# Patient Record
Sex: MALE | Race: WHITE | Employment: OTHER | URBAN - METROPOLITAN AREA
[De-identification: names, ages, dates, MRNs, and addresses within clinical notes are randomized per-mention and may not be internally consistent; named-entity substitution may affect disease eponyms.]

---

## 2018-08-30 ENCOUNTER — HOSPITAL ENCOUNTER (INPATIENT)
Age: 83
LOS: 1 days | Discharge: HOME | DRG: 244 | End: 2018-08-31
Attending: EMERGENCY MEDICINE | Admitting: INTERNAL MEDICINE
Payer: COMMERCIAL

## 2018-08-30 ENCOUNTER — APPOINTMENT (OUTPATIENT)
Dept: CARDIAC CATH/INVASIVE PROCEDURES | Age: 83
DRG: 244 | End: 2018-08-30
Payer: COMMERCIAL

## 2018-08-30 ENCOUNTER — APPOINTMENT (OUTPATIENT)
Dept: GENERAL RADIOLOGY | Age: 83
DRG: 244 | End: 2018-08-30
Payer: COMMERCIAL

## 2018-08-30 DIAGNOSIS — I44.2 COMPLETE HEART BLOCK (HCC): Primary | ICD-10-CM

## 2018-08-30 LAB
ABSOLUTE EOS #: 0.17 K/UL (ref 0–0.44)
ABSOLUTE IMMATURE GRANULOCYTE: <0.03 K/UL (ref 0–0.3)
ABSOLUTE LYMPH #: 1.59 K/UL (ref 1.1–3.7)
ABSOLUTE MONO #: 0.78 K/UL (ref 0.1–1.2)
ANION GAP SERPL CALCULATED.3IONS-SCNC: 9 MMOL/L (ref 9–17)
BASOPHILS # BLD: 1 % (ref 0–2)
BASOPHILS ABSOLUTE: 0.05 K/UL (ref 0–0.2)
BUN BLDV-MCNC: 14 MG/DL (ref 8–23)
BUN/CREAT BLD: NORMAL (ref 9–20)
CALCIUM SERPL-MCNC: 8.6 MG/DL (ref 8.6–10.4)
CHLORIDE BLD-SCNC: 105 MMOL/L (ref 98–107)
CO2: 24 MMOL/L (ref 20–31)
CREAT SERPL-MCNC: 0.73 MG/DL (ref 0.7–1.2)
DIFFERENTIAL TYPE: ABNORMAL
EKG ATRIAL RATE: 54 BPM
EKG P AXIS: 47 DEGREES
EKG Q-T INTERVAL: 544 MS
EKG QRS DURATION: 130 MS
EKG QTC CALCULATION (BAZETT): 390 MS
EKG R AXIS: -17 DEGREES
EKG T AXIS: -2 DEGREES
EKG VENTRICULAR RATE: 31 BPM
EOSINOPHILS RELATIVE PERCENT: 2 % (ref 1–4)
GFR AFRICAN AMERICAN: >60 ML/MIN
GFR NON-AFRICAN AMERICAN: >60 ML/MIN
GFR SERPL CREATININE-BSD FRML MDRD: NORMAL ML/MIN/{1.73_M2}
GFR SERPL CREATININE-BSD FRML MDRD: NORMAL ML/MIN/{1.73_M2}
GLUCOSE BLD-MCNC: 93 MG/DL (ref 70–99)
HCT VFR BLD CALC: 43.9 % (ref 40.7–50.3)
HEMOGLOBIN: 14.3 G/DL (ref 13–17)
IMMATURE GRANULOCYTES: 0 %
LYMPHOCYTES # BLD: 19 % (ref 24–43)
MCH RBC QN AUTO: 30.9 PG (ref 25.2–33.5)
MCHC RBC AUTO-ENTMCNC: 32.6 G/DL (ref 28.4–34.8)
MCV RBC AUTO: 94.8 FL (ref 82.6–102.9)
MONOCYTES # BLD: 10 % (ref 3–12)
NRBC AUTOMATED: 0 PER 100 WBC
PDW BLD-RTO: 13.3 % (ref 11.8–14.4)
PLATELET # BLD: 186 K/UL (ref 138–453)
PLATELET ESTIMATE: ABNORMAL
PMV BLD AUTO: 11 FL (ref 8.1–13.5)
POC TROPONIN I: 0.05 NG/ML (ref 0–0.1)
POC TROPONIN INTERP: NORMAL
POTASSIUM SERPL-SCNC: 4.3 MMOL/L (ref 3.7–5.3)
RBC # BLD: 4.63 M/UL (ref 4.21–5.77)
RBC # BLD: ABNORMAL 10*6/UL
SEG NEUTROPHILS: 68 % (ref 36–65)
SEGMENTED NEUTROPHILS ABSOLUTE COUNT: 5.59 K/UL (ref 1.5–8.1)
SODIUM BLD-SCNC: 138 MMOL/L (ref 135–144)
TROPONIN INTERP: ABNORMAL
TROPONIN INTERP: NORMAL
TROPONIN INTERP: NORMAL
TROPONIN T: 0.08 NG/ML
TROPONIN T: <0.03 NG/ML
TROPONIN T: <0.03 NG/ML
TSH SERPL DL<=0.05 MIU/L-ACNC: 1.65 MIU/L (ref 0.3–5)
WBC # BLD: 8.2 K/UL (ref 3.5–11.3)
WBC # BLD: ABNORMAL 10*3/UL

## 2018-08-30 PROCEDURE — 99285 EMERGENCY DEPT VISIT HI MDM: CPT

## 2018-08-30 PROCEDURE — 71045 X-RAY EXAM CHEST 1 VIEW: CPT

## 2018-08-30 PROCEDURE — 6370000000 HC RX 637 (ALT 250 FOR IP): Performed by: HOSPITALIST

## 2018-08-30 PROCEDURE — 2500000003 HC RX 250 WO HCPCS

## 2018-08-30 PROCEDURE — 2709999900 HC NON-CHARGEABLE SUPPLY

## 2018-08-30 PROCEDURE — 93005 ELECTROCARDIOGRAM TRACING: CPT

## 2018-08-30 PROCEDURE — 33208 INSRT HEART PM ATRIAL & VENT: CPT

## 2018-08-30 PROCEDURE — 0JH606Z INSERTION OF PACEMAKER, DUAL CHAMBER INTO CHEST SUBCUTANEOUS TISSUE AND FASCIA, OPEN APPROACH: ICD-10-PCS | Performed by: INTERNAL MEDICINE

## 2018-08-30 PROCEDURE — 85025 COMPLETE CBC W/AUTO DIFF WBC: CPT

## 2018-08-30 PROCEDURE — C1898 LEAD, PMKR, OTHER THAN TRANS: HCPCS

## 2018-08-30 PROCEDURE — 84443 ASSAY THYROID STIM HORMONE: CPT

## 2018-08-30 PROCEDURE — 2580000003 HC RX 258: Performed by: HOSPITALIST

## 2018-08-30 PROCEDURE — 2060000000 HC ICU INTERMEDIATE R&B

## 2018-08-30 PROCEDURE — 02H63JZ INSERTION OF PACEMAKER LEAD INTO RIGHT ATRIUM, PERCUTANEOUS APPROACH: ICD-10-PCS | Performed by: INTERNAL MEDICINE

## 2018-08-30 PROCEDURE — 02HK3JZ INSERTION OF PACEMAKER LEAD INTO RIGHT VENTRICLE, PERCUTANEOUS APPROACH: ICD-10-PCS | Performed by: INTERNAL MEDICINE

## 2018-08-30 PROCEDURE — 93308 TTE F-UP OR LMTD: CPT

## 2018-08-30 PROCEDURE — C1894 INTRO/SHEATH, NON-LASER: HCPCS

## 2018-08-30 PROCEDURE — 2580000003 HC RX 258

## 2018-08-30 PROCEDURE — C1785 PMKR, DUAL, RATE-RESP: HCPCS

## 2018-08-30 PROCEDURE — 84484 ASSAY OF TROPONIN QUANT: CPT

## 2018-08-30 PROCEDURE — 6370000000 HC RX 637 (ALT 250 FOR IP): Performed by: INTERNAL MEDICINE

## 2018-08-30 PROCEDURE — 6360000002 HC RX W HCPCS

## 2018-08-30 PROCEDURE — 80048 BASIC METABOLIC PNL TOTAL CA: CPT

## 2018-08-30 PROCEDURE — 36415 COLL VENOUS BLD VENIPUNCTURE: CPT

## 2018-08-30 RX ORDER — LISINOPRIL 20 MG/1
20 TABLET ORAL DAILY
Status: DISCONTINUED | OUTPATIENT
Start: 2018-08-30 | End: 2018-08-31 | Stop reason: HOSPADM

## 2018-08-30 RX ORDER — SODIUM CHLORIDE 0.9 % (FLUSH) 0.9 %
10 SYRINGE (ML) INJECTION EVERY 12 HOURS SCHEDULED
Status: CANCELLED | OUTPATIENT
Start: 2018-08-30

## 2018-08-30 RX ORDER — POTASSIUM CHLORIDE 20 MEQ/1
40 TABLET, EXTENDED RELEASE ORAL PRN
Status: DISCONTINUED | OUTPATIENT
Start: 2018-08-30 | End: 2018-08-31 | Stop reason: HOSPADM

## 2018-08-30 RX ORDER — SIMVASTATIN 20 MG
TABLET ORAL
COMMUNITY
Start: 2018-03-08

## 2018-08-30 RX ORDER — OXYCODONE HYDROCHLORIDE 5 MG/1
5 TABLET ORAL EVERY 4 HOURS PRN
Status: DISCONTINUED | OUTPATIENT
Start: 2018-08-30 | End: 2018-08-31 | Stop reason: HOSPADM

## 2018-08-30 RX ORDER — SODIUM CHLORIDE 0.9 % (FLUSH) 0.9 %
10 SYRINGE (ML) INJECTION PRN
Status: DISCONTINUED | OUTPATIENT
Start: 2018-08-30 | End: 2018-08-30 | Stop reason: SDUPTHER

## 2018-08-30 RX ORDER — MELOXICAM 15 MG/1
1 TABLET ORAL
Status: ON HOLD | COMMUNITY
Start: 2016-02-02 | End: 2018-08-30 | Stop reason: ALTCHOICE

## 2018-08-30 RX ORDER — SODIUM CHLORIDE 0.9 % (FLUSH) 0.9 %
10 SYRINGE (ML) INJECTION PRN
Status: CANCELLED | OUTPATIENT
Start: 2018-08-30

## 2018-08-30 RX ORDER — ONDANSETRON 2 MG/ML
4 INJECTION INTRAMUSCULAR; INTRAVENOUS EVERY 6 HOURS PRN
Status: DISCONTINUED | OUTPATIENT
Start: 2018-08-30 | End: 2018-08-30 | Stop reason: SDUPTHER

## 2018-08-30 RX ORDER — OXYCODONE HYDROCHLORIDE 5 MG/1
10 TABLET ORAL EVERY 4 HOURS PRN
Status: DISCONTINUED | OUTPATIENT
Start: 2018-08-30 | End: 2018-08-31 | Stop reason: HOSPADM

## 2018-08-30 RX ORDER — HYDRALAZINE HYDROCHLORIDE 20 MG/ML
10 INJECTION INTRAMUSCULAR; INTRAVENOUS EVERY 6 HOURS PRN
Status: DISCONTINUED | OUTPATIENT
Start: 2018-08-30 | End: 2018-08-30 | Stop reason: RX

## 2018-08-30 RX ORDER — ONDANSETRON 2 MG/ML
4 INJECTION INTRAMUSCULAR; INTRAVENOUS EVERY 6 HOURS PRN
Status: DISCONTINUED | OUTPATIENT
Start: 2018-08-30 | End: 2018-08-31 | Stop reason: HOSPADM

## 2018-08-30 RX ORDER — SODIUM CHLORIDE 0.9 % (FLUSH) 0.9 %
10 SYRINGE (ML) INJECTION PRN
Status: DISCONTINUED | OUTPATIENT
Start: 2018-08-30 | End: 2018-08-31 | Stop reason: HOSPADM

## 2018-08-30 RX ORDER — ATROPINE SULFATE 0.1 MG/ML
1 INJECTION INTRAVENOUS PRN
Status: DISCONTINUED | OUTPATIENT
Start: 2018-08-30 | End: 2018-08-31 | Stop reason: HOSPADM

## 2018-08-30 RX ORDER — VANCOMYCIN HYDROCHLORIDE 1 G/200ML
1000 INJECTION, SOLUTION INTRAVENOUS ONCE
Status: DISCONTINUED | OUTPATIENT
Start: 2018-08-30 | End: 2018-08-31 | Stop reason: HOSPADM

## 2018-08-30 RX ORDER — DOXYCYCLINE HYCLATE 100 MG
100 TABLET ORAL EVERY 12 HOURS SCHEDULED
Status: DISCONTINUED | OUTPATIENT
Start: 2018-08-30 | End: 2018-08-31 | Stop reason: HOSPADM

## 2018-08-30 RX ORDER — ACETAMINOPHEN 325 MG/1
650 TABLET ORAL EVERY 4 HOURS PRN
Status: DISCONTINUED | OUTPATIENT
Start: 2018-08-30 | End: 2018-08-31 | Stop reason: HOSPADM

## 2018-08-30 RX ORDER — LISINOPRIL 20 MG/1
1 TABLET ORAL 2 TIMES DAILY
COMMUNITY
Start: 2017-11-06

## 2018-08-30 RX ORDER — MAGNESIUM SULFATE 1 G/100ML
1 INJECTION INTRAVENOUS PRN
Status: DISCONTINUED | OUTPATIENT
Start: 2018-08-30 | End: 2018-08-31 | Stop reason: HOSPADM

## 2018-08-30 RX ORDER — POTASSIUM CHLORIDE 7.45 MG/ML
10 INJECTION INTRAVENOUS PRN
Status: DISCONTINUED | OUTPATIENT
Start: 2018-08-30 | End: 2018-08-31 | Stop reason: HOSPADM

## 2018-08-30 RX ORDER — SODIUM CHLORIDE 0.9 % (FLUSH) 0.9 %
10 SYRINGE (ML) INJECTION EVERY 12 HOURS SCHEDULED
Status: DISCONTINUED | OUTPATIENT
Start: 2018-08-30 | End: 2018-08-30 | Stop reason: SDUPTHER

## 2018-08-30 RX ORDER — SODIUM CHLORIDE 0.9 % (FLUSH) 0.9 %
10 SYRINGE (ML) INJECTION EVERY 12 HOURS SCHEDULED
Status: DISCONTINUED | OUTPATIENT
Start: 2018-08-30 | End: 2018-08-31 | Stop reason: HOSPADM

## 2018-08-30 RX ORDER — SIMVASTATIN 20 MG
20 TABLET ORAL NIGHTLY
Status: DISCONTINUED | OUTPATIENT
Start: 2018-08-30 | End: 2018-08-31 | Stop reason: HOSPADM

## 2018-08-30 RX ORDER — MULTIVIT WITH MINERALS/LUTEIN
1000 TABLET ORAL DAILY
COMMUNITY

## 2018-08-30 RX ORDER — HYDRALAZINE HYDROCHLORIDE 25 MG/1
1 TABLET, FILM COATED ORAL
Status: ON HOLD | COMMUNITY
Start: 2015-01-21 | End: 2018-08-30 | Stop reason: ALTCHOICE

## 2018-08-30 RX ORDER — LABETALOL HYDROCHLORIDE 5 MG/ML
5 INJECTION, SOLUTION INTRAVENOUS EVERY 6 HOURS PRN
Status: DISCONTINUED | OUTPATIENT
Start: 2018-08-30 | End: 2018-08-31 | Stop reason: HOSPADM

## 2018-08-30 RX ORDER — AMLODIPINE BESYLATE 10 MG/1
10 TABLET ORAL DAILY
Status: DISCONTINUED | OUTPATIENT
Start: 2018-08-30 | End: 2018-08-31 | Stop reason: HOSPADM

## 2018-08-30 RX ORDER — AMLODIPINE BESYLATE 5 MG/1
1 TABLET ORAL
COMMUNITY
Start: 2016-10-19

## 2018-08-30 RX ORDER — POTASSIUM CHLORIDE 20MEQ/15ML
40 LIQUID (ML) ORAL PRN
Status: DISCONTINUED | OUTPATIENT
Start: 2018-08-30 | End: 2018-08-31 | Stop reason: HOSPADM

## 2018-08-30 RX ORDER — SODIUM CHLORIDE 9 MG/ML
INJECTION, SOLUTION INTRAVENOUS CONTINUOUS
Status: DISCONTINUED | OUTPATIENT
Start: 2018-08-30 | End: 2018-08-30

## 2018-08-30 RX ADMIN — ACETAMINOPHEN 650 MG: 325 TABLET ORAL at 22:44

## 2018-08-30 RX ADMIN — Medication 10 ML: at 21:01

## 2018-08-30 RX ADMIN — DOXYCYCLINE HYCLATE 100 MG: 100 TABLET, COATED ORAL at 21:00

## 2018-08-30 RX ADMIN — LISINOPRIL 20 MG: 20 TABLET ORAL at 17:51

## 2018-08-30 RX ADMIN — SIMVASTATIN 20 MG: 20 TABLET, FILM COATED ORAL at 21:00

## 2018-08-30 ASSESSMENT — PAIN SCALES - GENERAL: PAINLEVEL_OUTOF10: 9

## 2018-08-30 NOTE — ED PROVIDER NOTES
[facility] 79. 3  Emergency Department Encounter  Emergency Medicine Resident     Pt Name: Dheeraj Holguin  MRN: [id number]  [place] 1935  Date of evaluation: 8/30/18  PCP:  Tish Pérez MD    [address] [place] [address]       Chief Complaint   Patient presents with    Shortness of Breath     upon exertion          HISTORY OF PRESENT ILLNESS  (Location/Symptom, Timing/Onset, Context/Setting, Quality, Duration, Modifying Factors, Severity.)      Dheeraj Holguin is a 82 y.o. male who presents with SOB. He was at a cardiology appt with Dr. Tish Pérez this morning where he was found to be bradycardic to the 30s. EKG shows complete heart block. Patient has noticed SOB on exertion, increasing fatigue, palpitations and just \"not feeling well\" since August 12th. He denies SOB at rest, chest pain, dizziness, syncope, diaphoresis, heart racing, HA, abd pain, fever, chills. He has no known heart problems. PAST MEDICAL / SURGICAL / SOCIAL / FAMILY HISTORY      has a past medical history of Hyperlipidemia and Hypertension. Denies relevant past surgical history  Social History     Social History    Marital status:      Spouse name: N/A    Number of children: N/A    Years of education: N/A     Occupational History    Not on file. Social History Main Topics    Smoking status: Never Smoker    Smokeless tobacco: Never Used    Alcohol use No    Drug use: No    Sexual activity: Not on file     Other Topics Concern    Not on file     Social History Narrative    No narrative on file       Patient advised to stop smoking or to avoid tobacco use. History reviewed. No pertinent family history. Allergies:  Cephalosporins    Home Medications:  Prior to Admission medications    Medication Sig Start Date End Date Taking?  Authorizing Provider   amLODIPine (NORVASC) 5 MG tablet Take 1 tablet by mouth 10/19/16  Yes Historical Provider, MD   lisinopril (PRINIVIL;ZESTRIL) 20 MG tablet Take 1 tablet by mouth 2 times daily 11/6/17  Yes Historical Provider, MD   simvastatin (ZOCOR) 20 MG tablet TAKE 1 TABLET DAILY 3/8/18  Yes Historical Provider, MD   Ascorbic Acid (VITAMIN C) 1000 MG tablet Take 1,000 mg by mouth daily    Historical Provider, MD       REVIEW OF SYSTEMS    (2-9 systems for level 4, 10 or more for level 5)      Review of Systems   Constitutional: Positive for fatigue. Negative for chills and fever. Respiratory: Positive for shortness of breath. Negative for cough and chest tightness. Cardiovascular: Negative for chest pain and leg swelling. Gastrointestinal: Negative for abdominal pain, diarrhea, nausea and vomiting. Genitourinary: Negative for dysuria and hematuria. Musculoskeletal: Negative for arthralgias and back pain. Skin: Negative for rash and wound. Allergic/Immunologic: Negative for food allergies and immunocompromised state. Neurological: Negative for dizziness, syncope, weakness and headaches. Psychiatric/Behavioral: Negative for suicidal ideas. The patient is not nervous/anxious. PHYSICAL EXAM   (up to 7 for level 4, 8 or more for level 5)      INITIAL VITALS:   /66   Pulse (!) 31   Temp 97.8 °F (36.6 °C) (Oral)   Resp 18   Ht 6' (1.829 m)   Wt 180 lb (81.6 kg)   SpO2 95%   BMI 24.41 kg/m²     Physical Exam   Constitutional: He is oriented to person, place, and time. He appears well-developed and well-nourished. No distress. HENT:   Head: Normocephalic and atraumatic. Right Ear: External ear normal.   Left Ear: External ear normal.   Nose: Nose normal.   Eyes: Pupils are equal, round, and reactive to light. Conjunctivae and EOM are normal. Right eye exhibits no discharge. Left eye exhibits no discharge. Neck: Normal range of motion. Neck supple. No JVD present. No tracheal deviation present. Cardiovascular: Regular rhythm and normal heart sounds. Bradycardia present. Exam reveals no gallop and no friction rub. No murmur heard.   Pulmonary/Chest: Effort 107 mmol/L    CO2 24 20 - 31 mmol/L    Anion Gap 9 9 - 17 mmol/L    GFR Non-African American >60 >60 mL/min    GFR African American >60 >60 mL/min    GFR Comment          GFR Staging NOT REPORTED    CBC Auto Differential   Result Value Ref Range    WBC 8.2 3.5 - 11.3 k/uL    RBC 4.63 4.21 - 5.77 m/uL    Hemoglobin 14.3 13.0 - 17.0 g/dL    Hematocrit 43.9 40.7 - 50.3 %    MCV 94.8 82.6 - 102.9 fL    MCH 30.9 25.2 - 33.5 pg    MCHC 32.6 28.4 - 34.8 g/dL    RDW 13.3 11.8 - 14.4 %    Platelets 346 469 - 754 k/uL    MPV 11.0 8.1 - 13.5 fL    NRBC Automated 0.0 0.0 per 100 WBC    Differential Type NOT REPORTED     Seg Neutrophils 68 (H) 36 - 65 %    Lymphocytes 19 (L) 24 - 43 %    Monocytes 10 3 - 12 %    Eosinophils % 2 1 - 4 %    Basophils 1 0 - 2 %    Immature Granulocytes 0 0 %    Segs Absolute 5.59 1.50 - 8.10 k/uL    Absolute Lymph # 1.59 1.10 - 3.70 k/uL    Absolute Mono # 0.78 0.10 - 1.20 k/uL    Absolute Eos # 0.17 0.00 - 0.44 k/uL    Basophils # 0.05 0.00 - 0.20 k/uL    Absolute Immature Granulocyte <0.03 0.00 - 0.30 k/uL    WBC Morphology NOT REPORTED     RBC Morphology NOT REPORTED     Platelet Estimate NOT REPORTED    Troponin   Result Value Ref Range    Troponin T <0.03 <0.03 ng/mL    Troponin Interp         TSH with Reflex   Result Value Ref Range    TSH 1.65 0.30 - 5.00 mIU/L   POCT troponin   Result Value Ref Range    POC Troponin I 0.05 0.00 - 0.10 ng/mL    POC Troponin Interp       The Troponin-I (POC) results cannot be compared to the Troponin-T results. EKG 12 Lead   Result Value Ref Range    Ventricular Rate 31 BPM    Atrial Rate 54 BPM    QRS Duration 130 ms    Q-T Interval 544 ms    QTc Calculation (Bazett) 390 ms    P Axis 47 degrees    R Axis -17 degrees    T Axis -2 degrees       IMPRESSION: This is an 82-year-old male presenting for shortness of breath from his cardiologist office. On physical exam the patient is well-appearing and in no acute distress.   His pulse is 31, respirations are 12 questions. PROCEDURES:  None    CONSULTS:  IP CONSULT TO CARDIOLOGY  IP CONSULT TO INTERNAL MEDICINE      FINAL IMPRESSION      1.  Complete heart block ([address]          DISPOSITION / PLAN     DISPOSITION Admitted 08/30/2018 11:59:25 AM      PATIENT REFERRED TO:  Cristo Howard MD  [phone]  [facility] [phone]            DISCHARGE MEDICATIONS:  Current Discharge Medication List          Nancy Patel DO  Emergency Medicine Resident    (Please note that portions of this note were completed with a voice recognition program.  Efforts were made to edit the dictations but occasionally words are mis-transcribed.)     Nancy Patel,   08/30/18 [address], DO  09/10/18 4230

## 2018-08-30 NOTE — ED PROVIDER NOTES
[facility]     Emergency Department     Faculty Attestation    I performed a history and physical examination of the patient and discussed management with the resident. I have reviewed and agree with the residents findings including all diagnostic interpretations, and treatment plans as written at the time of my review. Any areas of disagreement are noted on the chart. I was personally present for the key portions of any procedures. I have documented in the chart those procedures where I was not present during the key portions. I agree with the chief complaint, past medical history, past surgical history, allergies, medications, social and family history as documented unless otherwise noted below. Documentation of the HPI, Physical Exam and Medical Decision Making performed by miladysibyessica is based on my personal performance of the HPI, PE and MDM. For Physician Assistant/ Nurse Practitioner cases/documentation I have personally evaluated this patient and have completed at least one if not all key elements of the E/M (history, physical exam, and MDM). Additional findings are as noted. Primary Care Physician: Christi Kidd MD    History: This is a 82 y.o. male who presents to the Emergency Department with complaint of Shortness of breath. The patient presents with a complaint of shortness of breath and fatigue that began on August 12. Patient denies any cough or chest pain. The patient denies any fever, chills or sweats. The patient denied noting any increased swelling of his extremities more than his baseline. The patient was seen at his cardiologist's office this morning and sent to the emergency department immediately for workup and admission or bradycardia. Physical:   height is 6' (1.829 m) and weight is 180 lb (81.6 kg). His oral temperature is 97.8 °F (36.6 °C). His pulse is 31 (abnormal). His respiration is 12 and oxygen saturation is 97%.   Lungs are clear auscultation bilaterally, heart bradycardic with a regular rhythm, abdomen is soft nontender, lower extremity 2+ pitting edema bilaterally    Impression: Third-degree heart block    Plan: [place], CBC, BMP, troponin, cardiology consultation, admission      EKG Interpretation    Interpreted by me  Bradycardia with a complete heart block in any ventricular rhythm ventricular rate of 31, right bundle branch block, minimal voltage criteria for left ventricular hypertrophy, normal QT corrected, Left axis deviation, Inferior infarct, age indeterminant  Impression: Bradycardia with a ventricular rate of 30, complete heart block, right bundle branch block, minimal for discharge of left ventricular hypertrophy, Left axis deviation, inferior infarct, age undetermined  Compared EKG of May 25, 2010, third-degree heart block has replaced previous sinus bradycardia, ventricular rate has decreased by 14        CRITICAL CARE: There was a high probability of clinically significant/life threatening deterioration in this patient's condition which required my urgent intervention. Total critical care time was 10 minutes. This excludes any time for separately reportable procedures. (Please note that portions of this note were completed with a voice recognition program.  Efforts were made to edit the dictations but occasionally words are mis-transcribed.)    Lea Burnette.  Davin Sotelo MD, [facility]  Attending Emergency Medicine Physician        Zahida Bryant MD  08/30/18 [address] [facility] Davin Sotelo MD  08/30/18 [id number]

## 2018-08-30 NOTE — CARE COORDINATION
Case Management Initial Discharge Plan  Lavtiara Older,         Readmission Risk              Risk of Unplanned Readmission:        0               Met with:patient to discuss discharge plans. Information verified: address, contacts, phone number, , insurance Yes  PCP: Rufina Saxena MD  Date of last visit: last year    Insurance Provider: [employer]    Discharge Planning    Living Arrangements:      Support Systems:       Home has 1 stories  2 stairs to climb to get into front door. Patient able to perform ADL's:Independent    Current Services (outpatient & in home) none  DME equipment: has a cane, walker, wheelchair, elevated toilet sea. .does note use. DME provider:     Ph[facility]acy: [facility] in [place] and [address] Potential Assistance Purchasing Medications:     Does patient want to participate in local refill/ meds to beds program?       Potential Assistance Needed:       Patient agreeable to home care: No  [place] of choice provided:  n/a    Prior SNF/Rehab Placement and Facility:   Agreeable to SNF/Rehab: No  [place] of choice provided: n/a   Evaluation: no    Expected Discharge date:     Patient expects to be discharged to: Follow Up Appointment: Best Day/ Time:      Transportation provider: self  Transportation arrangements needed for discharge: No    Discharge Plan: return to home, no skilled needs identified at present.         Electronically signed by Juana Tavera RN on 18 at 12:50 PM

## 2018-08-30 NOTE — PROCEDURES
technique with 2 separate sticks. J tip 0.035 inch guide wires were introduced and their course through the venous system was confirmed by their presence under fluoroscopy in the inferior vena. RV Lead Implant:   A peel away sheath was brought to the field and placed into the venous system via over the wire technique. The right ventricular lead was placed via this sheath into the right ventricle under fluoroscopy. The lead was attached via active fixation to the distal RV septum. Adequate sensing and threshold parameters were obtained. There was no evidence of diaphragmatic stimulation at 10 V output. The peel away sheath was removed and the lead collar was advanced to the pectoral muscle and sutured with non absorbable suture. Stability of the lead and the length of the lead's slack was assessed as optimal with fluoroscopy. RA Lead Implant:  A peel away sheath was brought to the field and placed into the venous system via over the wire technique. The right atrial lead was placed via this sheath into the right atrium. The lead was attached via active fixation to the high lateral right atrial free wall. Adequate sensing and threshold parameters were obtained. There was no evidence of diaphragmatic stimulation at 10 V output. The peel away sheath was removed and the lead collar was advanced to the pectoral muscle and sutured with non absorbable suture. Stability of the lead and the length of the lead's slack was assessed as optimal with fluoroscopy. Generator: The implanted leads were attached to the dual chamber PPM device using the setscrews. The pocket was irrigated with antibiotic solution. The pulse generator and leads were coiled and placed in the pocket. Fluoroscopy was used to verify the final placement of the pacemaker and leads. The pocket was closed using multiple layers of suture and a dry sterile dressing was applied.      There were no complications, patient tolerated the procedure

## 2018-08-30 NOTE — CONSULTS
Attestation signed by      Attending Physician Statement:    I have discussed the care of  Gisella Huerta , including pertinent history and exam findings, with the Cardiology fellow/resident. I have seen and examined the patient and the key elements of all parts of the encounter have been performed by me. I agree with the assessment, plan and orders as documented by the fellow/resident, after I modified exam findings and plan of treatments, and the final version is my approved version of the assessment. Additional Comments: he has CHB with sx, plan temporary pacer today and PPM tomorrow. Michael Ly MD               [facility]    Consult / H&P               Today's Date: 8/30/2018  Patient Name: Gisella Huerta  Date of admission: 8/30/2018 10:32 AM  Patient's age: 82 y.o., 1935  Admission Dx: Complete heart block ([address] [I44.2]    Reason for Consult:  Complete Heart Block    Requesting Physician: No admitting provider for patient encounter. CHIEF COMPLAINT:  Increased shortness of breath    History Obtained From:  patient, electronic medical record    HISTORY OF PRESENT ILLNESS:      The patient is a 82 y.o.  male with PMH of Hypertension who is admitted to the hospital for increased shortness of breath since 2 weeks. Patient was seen at cardiology clinic today and was sent to the ER for bradycardia. EKG shows Complete Heart Block. HR in 20-30s. Patient resting comfortably on the bed. He sees Dr. Jesse Gilford once a year and states he goes only for annual check up. States he was there in December. He has been a very active person and complains of only becoming short of breath when he exerts. Denies any chest pain, LE edema. Echo TTE 4/09/18: EF 55%. Mild MR/TR. Denies any previous stents of bypass surgery in the past. Denies being on any blood thinners and not even Aspirin. Past Medical History:   has a past medical history of Hyperlipidemia and Hypertension.     Past input(s): AST, ALT, LABALBU in the last 72 hours. IMPRESSION:    Patient Active Problem List   Diagnosis    Complete heart block (HCC)     IMPRESSION    1. Complete Heart Block  2. Hypertension  3. Hyperlipidemia      RECOMMENDATIONS:    1. Temporary Pacemaker today. Will discuss with attending for final recommendations. Discussed with patient and Nurse.     Electronically signed by Kylah Alarcon MD on 8/30/2018 at 12:30 PM    [facility]      [phone]

## 2018-08-30 NOTE — ED NOTES
Pt to ED via EMS per Dr. Jemima Mendoza office. Pt said he had a check up at Dr. Jemima Mendoza because he is scheduled to get a pacemaker, however, upon arrival and checking his vitals they noticed his HR was in the 30's and in a 3rd degree heartblock. Recommneded pt be brought here via Ems. Pt is alert and orientedx3, denies any pain, SOB upon exertion. Pt denies any use of home oxygen. Will continue to monitor.       Haroon Melendez RN  08/30/18 0593

## 2018-08-30 NOTE — PROGRESS NOTES
Cardiac Testing:    TTE 4/09/18: EF 55%. Mild MR/TR    STRESS 6/18/08: No ischemia or infarct.  EF 47%

## 2018-08-30 NOTE — ED NOTES
Bed: 33  Expected date:   Expected time:   Means of arrival:   Comments:  LANA [address], RN  08/30/18 [id number]

## 2018-08-31 ENCOUNTER — APPOINTMENT (OUTPATIENT)
Dept: GENERAL RADIOLOGY | Age: 83
DRG: 244 | End: 2018-08-31
Payer: COMMERCIAL

## 2018-08-31 VITALS
SYSTOLIC BLOOD PRESSURE: 129 MMHG | HEART RATE: 61 BPM | HEIGHT: 72 IN | BODY MASS INDEX: 27.5 KG/M2 | WEIGHT: 203 LBS | DIASTOLIC BLOOD PRESSURE: 69 MMHG | TEMPERATURE: 98.2 F | OXYGEN SATURATION: 96 % | RESPIRATION RATE: 18 BRPM

## 2018-08-31 PROBLEM — I10 HYPERTENSION: Status: ACTIVE | Noted: 2018-08-31

## 2018-08-31 PROBLEM — E78.5 HYPERLIPIDEMIA: Status: ACTIVE | Noted: 2018-08-31

## 2018-08-31 LAB
ABSOLUTE EOS #: 0.18 K/UL (ref 0–0.44)
ABSOLUTE IMMATURE GRANULOCYTE: 0.04 K/UL (ref 0–0.3)
ABSOLUTE LYMPH #: 1.72 K/UL (ref 1.1–3.7)
ABSOLUTE MONO #: 0.97 K/UL (ref 0.1–1.2)
ANION GAP SERPL CALCULATED.3IONS-SCNC: 13 MMOL/L (ref 9–17)
BASOPHILS # BLD: 1 % (ref 0–2)
BASOPHILS ABSOLUTE: 0.06 K/UL (ref 0–0.2)
BUN BLDV-MCNC: 20 MG/DL (ref 8–23)
BUN/CREAT BLD: ABNORMAL (ref 9–20)
CALCIUM SERPL-MCNC: 8.3 MG/DL (ref 8.6–10.4)
CHLORIDE BLD-SCNC: 104 MMOL/L (ref 98–107)
CO2: 22 MMOL/L (ref 20–31)
CREAT SERPL-MCNC: 0.86 MG/DL (ref 0.7–1.2)
DIFFERENTIAL TYPE: ABNORMAL
EOSINOPHILS RELATIVE PERCENT: 2 % (ref 1–4)
GFR AFRICAN AMERICAN: >60 ML/MIN
GFR NON-AFRICAN AMERICAN: >60 ML/MIN
GFR SERPL CREATININE-BSD FRML MDRD: ABNORMAL ML/MIN/{1.73_M2}
GFR SERPL CREATININE-BSD FRML MDRD: ABNORMAL ML/MIN/{1.73_M2}
GLUCOSE BLD-MCNC: 79 MG/DL (ref 70–99)
HCT VFR BLD CALC: 43.5 % (ref 40.7–50.3)
HEMOGLOBIN: 14 G/DL (ref 13–17)
IMMATURE GRANULOCYTES: 0 %
LYMPHOCYTES # BLD: 16 % (ref 24–43)
MCH RBC QN AUTO: 30.3 PG (ref 25.2–33.5)
MCHC RBC AUTO-ENTMCNC: 32.2 G/DL (ref 28.4–34.8)
MCV RBC AUTO: 94.2 FL (ref 82.6–102.9)
MONOCYTES # BLD: 9 % (ref 3–12)
NRBC AUTOMATED: 0 PER 100 WBC
PDW BLD-RTO: 13.3 % (ref 11.8–14.4)
PLATELET # BLD: 193 K/UL (ref 138–453)
PLATELET ESTIMATE: ABNORMAL
PMV BLD AUTO: 10.2 FL (ref 8.1–13.5)
POTASSIUM SERPL-SCNC: 4.2 MMOL/L (ref 3.7–5.3)
RBC # BLD: 4.62 M/UL (ref 4.21–5.77)
RBC # BLD: ABNORMAL 10*6/UL
SEG NEUTROPHILS: 72 % (ref 36–65)
SEGMENTED NEUTROPHILS ABSOLUTE COUNT: 7.95 K/UL (ref 1.5–8.1)
SODIUM BLD-SCNC: 139 MMOL/L (ref 135–144)
TROPONIN INTERP: ABNORMAL
TROPONIN T: 0.08 NG/ML
WBC # BLD: 10.9 K/UL (ref 3.5–11.3)
WBC # BLD: ABNORMAL 10*3/UL

## 2018-08-31 PROCEDURE — 80048 BASIC METABOLIC PNL TOTAL CA: CPT

## 2018-08-31 PROCEDURE — 85025 COMPLETE CBC W/AUTO DIFF WBC: CPT

## 2018-08-31 PROCEDURE — 97162 PT EVAL MOD COMPLEX 30 MIN: CPT

## 2018-08-31 PROCEDURE — 94762 N-INVAS EAR/PLS OXIMTRY CONT: CPT

## 2018-08-31 PROCEDURE — 97530 THERAPEUTIC ACTIVITIES: CPT

## 2018-08-31 PROCEDURE — 36415 COLL VENOUS BLD VENIPUNCTURE: CPT

## 2018-08-31 PROCEDURE — G8978 MOBILITY CURRENT STATUS: HCPCS

## 2018-08-31 PROCEDURE — 6370000000 HC RX 637 (ALT 250 FOR IP): Performed by: HOSPITALIST

## 2018-08-31 PROCEDURE — 6370000000 HC RX 637 (ALT 250 FOR IP): Performed by: INTERNAL MEDICINE

## 2018-08-31 PROCEDURE — 84484 ASSAY OF TROPONIN QUANT: CPT

## 2018-08-31 PROCEDURE — 71046 X-RAY EXAM CHEST 2 VIEWS: CPT

## 2018-08-31 PROCEDURE — G8979 MOBILITY GOAL STATUS: HCPCS

## 2018-08-31 PROCEDURE — 2580000003 HC RX 258: Performed by: HOSPITALIST

## 2018-08-31 PROCEDURE — 99222 1ST HOSP IP/OBS MODERATE 55: CPT | Performed by: INTERNAL MEDICINE

## 2018-08-31 RX ORDER — DOXYCYCLINE HYCLATE 100 MG
100 TABLET ORAL EVERY 12 HOURS SCHEDULED
Qty: 20 TABLET | Refills: 0 | Status: SHIPPED | OUTPATIENT
Start: 2018-08-31 | End: 2018-09-10

## 2018-08-31 RX ADMIN — ACETAMINOPHEN 650 MG: 325 TABLET ORAL at 07:45

## 2018-08-31 RX ADMIN — Medication 10 ML: at 08:01

## 2018-08-31 RX ADMIN — AMLODIPINE BESYLATE 10 MG: 10 TABLET ORAL at 08:01

## 2018-08-31 RX ADMIN — LISINOPRIL 20 MG: 20 TABLET ORAL at 08:01

## 2018-08-31 RX ADMIN — DOXYCYCLINE HYCLATE 100 MG: 100 TABLET, COATED ORAL at 08:01

## 2018-08-31 RX ADMIN — SIMVASTATIN 20 MG: 20 TABLET, FILM COATED ORAL at 08:03

## 2018-08-31 ASSESSMENT — PAIN SCALES - GENERAL: PAINLEVEL_OUTOF10: 1

## 2018-08-31 NOTE — PROGRESS NOTES
[place]  Occupational Therapy Not Seen Note    DATE: 2018  Name: Ronni Kocher  : 1935  MRN: [id number]    Patient not available for Occupational Therapy due to:    [x] Testing: X-ray per RN [facility] @ 0830    [] Hemodialysis    [] Blood Transfusion in Progress    []Refusal by Patient:    [] Surgery/Procedure:    [] Strict Bedrest    [] Sedation    [] Spine Precautions     [] Pt being transferred to palliative care at this time. Spoke with pt/family and OT services to be defered. [] Pt independent with functional mobility and functional tasks.  Pt with no OT acute care needs at this time, will defer OT eval.    [] Other    Next Scheduled Treatment: Re-check 2018    Signature: JESSIKA Conn/L

## 2018-08-31 NOTE — PROGRESS NOTES
Smoking Cessation - topics covered   []  Health Risks  []  Benefits of Quitting   []  Smoking Cessation  []  Patient has no history of tobacco use  [x]  Patient is former smoker. [x]  No need for tobacco cessation education. []  Booklet given  []  Patient verbalizes understanding. []  Patient denies need for tobacco cessation education.   Brando Sjogren  7:59 AM

## 2018-08-31 NOTE — CARE COORDINATION
[place] [facility] [place]  Written by: Tati Montoya RN    Patient Name: Dheeraj Holguin  Attending Provider: No att. providers found  Admit Date: 2018 10:32 AM  MRN: [id number]  Account: [de-identified]                     : 1935  Discharge Date: 2018      Disposition: home    Tati Montoya RN

## 2018-08-31 NOTE — PROGRESS NOTES
[facility]   Progress Note                   Date:   8/31/2018  Patient name: Vicky Dennis  Date of admission:  8/30/2018 10:32 AM  MRN:   [id number]  YOB: 1935  PCP: Raquel Thomas MD    Reason for Admission: Complete heart block ([address] [I44.2]    Subjective:       Clinical Changes / Abnormalities: Seen & examined sitting quietly in bedside chair. Denies CP or SOB. States left chest site is \"sore but doesn't hurt. \" Tele paced 1:1    ROS    Medications:   Scheduled Meds:   simvastatin  20 mg Oral Nightly    sodium chloride flush  10 mL Intravenous 2 times per day    vancomycin  1,000 mg Intravenous Once    bacitracin in 0.9 % sodium chloride irrigation  50,000 Units Topical Once    doxycycline hyclate  100 mg Oral 2 times per day    lisinopril  20 mg Oral Daily    amLODIPine  10 mg Oral Daily     Continuous Infusions:  CBC:   Recent Labs      08/30/18   1117  08/31/18   0437   WBC  8.2  10.9   HGB  14.3  14.0   PLT  186  193     BMP:    Recent Labs      08/30/18   1117  08/31/18   0437   NA  138  139   K  4.3  4.2   CL  105  104   CO2  24  22   BUN  14  20   CREATININE  0.73  0.86   GLUCOSE  93  79     Hepatic: No results for input(s): AST, ALT, ALB, BILITOT, ALKPHOS in the last 72 hours. Troponin:   Recent Labs      08/30/18   1046   TROPONINI  0.05     BNP: No results for input(s): BNP in the last 72 hours. Lipids: No results for input(s): CHOL, HDL in the last 72 hours. Invalid input(s): LDLCALCU  INR: No results for input(s): INR in the last 72 hours. Objective:   Vitals: BP (!) 156/86   Pulse 63   Temp 98.2 °F (36.8 °C) (Oral)   Resp 18   Ht 6' (1.829 m)   Wt 203 lb (92.1 kg)   SpO2 95%   BMI 27.53 kg/m²   General appearance: alert and cooperative with exam  HEENT: Head: Normocephalic, no lesions, without obvious abnormality.   Neck: no JVD, trachea midline, no adenopathy  Lungs: Clear to auscultation  Heart: Regular rate and rhythm, s1/s2 auscultated, no

## 2018-08-31 NOTE — PROGRESS NOTES
(Pt denies any recent use)  ADL Assistance: Independent  Homemaking Assistance: Independent  Homemaking Responsibilities: Yes  Meal Prep Responsibility: Primary  Laundry Responsibility: Primary  Cleaning Responsibility: No (hired)  Shopping Responsibility: Primary  Other (Comment): Pt does most household chore tasks (other than cleaning) but wife states she can help him as needed. Ambulation Assistance: Independent  Transfer Assistance: Independent  Active : Yes  Mode of Transportation: Other ([address] [facility])  Occupation: Retired  Type of occupation:   Leisure & Hobbies: Likes to throw horse shoes  Additional Comments: Pt's wife works day shift but states she is only 5 minutes from home if he would need any assistance.   Objective          AROM RLE (degrees)  RLE AROM: WFL  AROM LLE (degrees)  LLE AROM : WFL  AROM RUE (degrees)  RUE AROM : WFL  AROM LUE (degrees)  LUE AROM : WFL  LUE General AROM: Shoulder not tested secondary to recent pacemaker placement but elbow WFL  Strength RLE  Comment: 4+/5  Strength LLE  Comment: 4+/5  Strength RUE  Comment: 4/5  Strength LUE  Comment: Not formally assessed; 3/5 elbow demonstrated     Sensation  Overall Sensation Status: WFL (Pt denies any numbness or tingling)  Bed mobility  Supine to Sit: Unable to assess  Sit to Supine: Unable to assess  Scooting: Unable to assess  Comment: Pt was up to chair upon arrival and requested to return to chair at end of session  Transfers  Sit to Stand: Contact guard assistance (Increased difficulty compared to his normal per pt, pt able to perform CGA)  Stand to sit: Contact guard assistance  Ambulation  Ambulation?: Yes  Ambulation 1  Surface: level tile  Device: No Device  Assistance: Stand by assistance;Contact guard assistance  Quality of Gait: CGA at times secondary to mild unsteadiness but otherwise SBA throughout gait, mild endurance deficits  Distance: 300ft  Stairs/Curb  Stairs?: No     Balance  Posture: Good  Sitting - Static: Good  Sitting - Dynamic: Good  Standing - Static: Good;-  Standing - Dynamic: Fair;+ (fair + to good - at times)  Comments: standing balance assessed without device        Assessment   Body structures, Functions, Activity limitations: Decreased functional mobility ; Decreased strength;Decreased endurance;Decreased balance  Assessment: Pt with mild balance and endurance deficits noted this date likely secondary to being less active the past couple of days after recent pacemaker placement. Pt with supportive wife to assist him at home as needed. Prognosis: Good  Decision Making: Medium Complexity  Patient Education: Educated on importance of mobility  Barriers to Learning: None  REQUIRES PT FOLLOW UP: Yes  Activity Tolerance  Activity Tolerance: Patient Tolerated treatment well;Patient limited by endurance  Activity Tolerance: Minor endurance deficits noted         Plan   Plan  Times per week: 5x  Current Treatment Recommendations: Strengthening, Balance Training, Functional Mobility Training, Transfer Training, Endurance Training, Gait Training, Stair training, Home Exercise Program, Patient/Caregiver Education & Training  Safety Devices  Type of devices: Call light within reach, Gait belt, Left in chair, Nurse notified  Restraints  Initially in place: No    G-Code  PT G-Codes  Functional Assessment Tool Used: [facility]  Score: 16  Functional Limitation: Mobility: Walking and moving around  Mobility: Walking and Moving Around Current Status ():  At least 40 percent but less than 60 percent impaired, limited or restricted  Mobility: Walking and Moving Around Goal Status (): 0 percent impaired, limited or restricted    AM-PAC Score  AM-PAC Inpatient Mobility Raw Score : 16  AM-PAC Inpatient T-Scale Score : 40.78  Mobility Inpatient CMS 0-100% Score: 54.16  Mobility Inpatient CMS G-Code Modifier : CK          Goals  Short term goals  Time Frame for Short term goals: 14 visits  Short

## 2018-09-05 NOTE — DISCHARGE SUMMARY
[address] [place] [address]     Department of Internal Medicine - Staff Internal Medicine Service    INPATIENT DISCHARGE SUMMARY      PATIENT IDENTIFICATION:  NAME:  Leana Hairston   :   1935  MRN:    [id number]     Acct:    [de-identified]   Admit Date:  2018  Discharge date:  2018  Attending Provider: No att. providers found                                     REASON FOR HOSPITALIZATION:   Leana Hairston is a 82 y.o. male who presented with a 2 week history of worsening SOB on exertion and chest pain. He was seen by Cardiology outpatient today and was found to be bradycardic. He was then directed to the ED. EKG showed complete heart block with HR of 20-30. Cardiology was already on board from the ED and the patient was transported for immediate permanent pacemaker placement. The patient denied any SOB prior to 2 weeks ago. He usually sees Dr. Luis Keith annually for a regular physical and had no other issues. He denied any LOC, falls, dizziness, lightheadedness prior. DIAGNOSES:  Primary:   Complete heart block ([address] [I44.2]    Secondary: Active Hospital Problems    Diagnosis Date Noted    Hypertension [I10] 2018    Hyperlipidemia [E78.5] 2018    Complete heart block ([address] [I44.2] 2018       TREATMENT:  Brief Inpatient Course: On admission, the patient was taken directly for placement of a permanent pacemaker. The procedure went without any acute complications. It was interrogated the following day and the patient was then discharged.     Consults:   cardiology    Procedures:  Permanent pacemaker placement    Any Hospital Acquired Infections: No    PATIENT'S DISCHARGE CONDITION:  Good    PATIENT/FAMILY INSTRUCTIONS:   Discharge Medication List as of 2018  3:01 PM      START taking these medications    Details   doxycycline hyclate (VIBRA-TABS) 100 MG tablet Take 1 tablet by mouth every 12 hours for 10 days, Disp-20 tablet, R-0Normal         CONTINUE these medications

## 2018-09-10 ASSESSMENT — ENCOUNTER SYMPTOMS
NAUSEA: 0
VOMITING: 0
ABDOMINAL PAIN: 0
DIARRHEA: 0
CHEST TIGHTNESS: 0
SHORTNESS OF BREATH: 1
COUGH: 0
BACK PAIN: 0

## 2022-03-16 NOTE — H&P
[facility] MEDICINE PROGRESS NOTE   Patient: Pepe Fermin  Today's Date: 3/16/2022    YOB: 1925  Admission Date: 3/9/2022    MRN: [id number]  Inpatient LOS: 5    Room: [facility]/A  Hospital Day: Hospital Day: 8    Subjective   HISTORY AND SUBJECTIVE COMPLAINTS     Chief Complaint:   Shortness of breath    Interval History / Subjective:   Breathing is same, no fever, feeling cold, is tired    Hospital Course:  Pepe Fermin is a 89 year old male who presented on 3/9/2022 with complaints of Shortness of Breath and Chest Pain Adult    89 year old male with past medical history including seizure disorder, chronic kidney disease, coronary artery disease, hypertension, presenting accompanied by his son with c/o shortness of breath beginning several days ago with minimal activity, aggravated when laying down. On day of presentation, patient saw his PCP where EKG was obtained, abnormal, and was referred to the ED. Patient denies chest pain, palpitations, headache. Denies cardiac history.    In the ED, labs with lactic acid 2.0, NTproBNP 71098, troponin not elevated. EKG showed atrial fibrillation with RVR.  Small bilateral pleural effusions concerning of mild congestion on chest x-ray.    S/p Echo (03/10) with new HFrEF (33%) and severe AS, severe MR, severe TR. EP on consult, adjusting metoprolol. Noted softer pressures on 03/10 with associated light-headedness and fatigue, still alert and oriented, improved s/p SPA x1. Nephrology also consulted for continued worsening of CASSI.    Continuing with Lasix PRN daily while monitoring BP. Started SPA and midodrine. Anticipate cardiac cath with renal recovery, he may also be a good candidate for TAVR. Noted LUQ abdominal pain, not associated with PO intolerance, with no urinary or bowel complaints. LA was normal on 03/12. Will continue to monitor abdominal pain, consider further workup if there are acute changes.    Nephrology has discussed  possibility of need for RRT after cath with patient and family. As of last discussion, patient reports he is willing to proceed with cardiac cath given this possibility.    ROS:  Pertinent systems negative except as above.    Objective   PHYSICAL EXAMINATION     Vital 24 Hour Range Most Recent Value   Temperature Temp  Min: 97.1 °F (36.2 °C)  Max: 98.7 °F (37.1 °C) 97.4 °F (36.3 °C)   Pulse Pulse  Min: 83  Max: 140 (!) 124   Respiratory Resp  Min: 18  Max: 18 18   Blood Pressure BP  Min: 95/52  Max: 118/98 117/73   Pulse Oximetry SpO2  Min: 96 %  Max: 99 % 99 %   Arterial BP No data recorded     O2 O2 Flow Rate (L/min)  Avg: 3.7 L/min  Min: 3 L/min   Min taken time: 03/16/22 1300  Max: 4 L/min   Max taken time: 03/16/22 0433       Recorded Intake and Output:    Intake/Output Summary (Last 24 hours) at 3/16/2022 1452  Last data filed at 3/16/2022 1428  Gross per 24 hour   Intake 1492.36 ml   Output 525 ml   Net 967.36 ml      Recorded Last Stool Occurrence: 1 (03/15/22 2221)     Vital Most Recent Value First Value   Weight 76.1 kg (167 lb 12.3 oz) Weight: 75.8 kg (167 lb 1.7 oz)   Height 5' 11\" (180.3 cm) Height: 5' 11\" (180.3 cm)   BMI 23.4 N/A     General: Looks  uncomfortable and tired-appearing  CV: irregularly irregular  Resp: diminished bilateral bases  Abd: soft, nontender and nondistended  Ext: no clubbing, cyanosis, or ischemia, edema absent  and radial pulses equal bilaterally and dorsalis pedis pulses equal bilaterally   Skin: no rashes, lesions, or ulcers noted  Neuro: no focal deficits noted  Psych: oriented to time, place and person and normal mood and affect    TEST RESULTS     Labs: The Laboratory values listed below have been reviewed and pertinent findings discussed in the Assessment and Plan.    Laboratory values:   Recent Labs   Lab 03/15/22  0544 03/14/22  0725 03/13/22  0443   WBC 6.3 5.2 6.7   HGB 10.2* 9.9* 10.4*   HCT 33.0* 32.9* 33.9*    161 167       Recent Labs   Lab 03/16/22  0529  03/15/22  0544 03/14/22  0725   SODIUM 137 136 137   POTASSIUM 4.3 4.6 4.3   CHLORIDE 104 103 104   CO2 24 21 25   CALCIUM 8.9 9.0 8.8   GLUCOSE 102* 124* 118*   BUN 78* 83* 77*   CREATININE 2.00* 2.37* 2.38*          Radiology: Imaging studies have been reviewed and pertinent findings discussed in the Assessment and Plan.  No results found for any visits on 03/09/22 (from the past 48 hour(s)).     ANCILLARY ORDERS     Diet:  Fluid Restrict 2000ml (1200 From Dietary), Sodium 2 Gm (low Sodium) Diet  Telemetry: On  Consults:    IP CONSULT TO CARDIAC REHAB  IP CONSULT TO PHARMACY  IP CONSULT TO ELECTROPHYSIOLOGY  IP CONSULT TO CARDIOLOGY  IP CONSULT TO NEPHROLOGY  IP CONSULT TO PALLIATIVE CARE  IP CONSULT TO SOCIAL WORK  Therapy Orders:   PT and OT Orders Placed this Encounter   Procedures   • Occupational Therapy   • Physical Therapy       Advance Care Planning    ADVANCED DIRECTIVES     Code Status: Full Resuscitation          ASSESSMENT AND PLAN     # Atrial fibrillation, new onset with rapid ventricular response  - Telemetry monitor  - Rates improved after IV metoprolol, continue metoprolol BID  - Eliquis held, starting heparin drip in anticipation of cardiac cath  - EP on consult    # Acute, new HFrEF  - Daily weight, I&O  - Echo (03/10) with new HFrEF (33%), severe AS, severe MR, severe TR  - Lasix 40mg daily PRN assessing daily with Nephrology, started midodrine and SPA  - Noted softer BPs  - Continue beta-blocker  - Cardiology following, discussed possible cath in next couple days as renal fx improved    # CASSI on CKD, stage 3  # Hyperkalemia  - Continue to monitor renal function, improving  - S/p [place] x1 on 03/11  - Urine studies with UProt/UCre ratio 186  - Nephrology following, discussed, will increase diuretics  - Holding nephrotoxic agents    # Borderline hypotension  # Essential hypertension  - With associated light-headedness and fatigue on 03/10, improved s/p SPA x1  - Continuing midodrine and SPA,  strength is 5 out of 5 all extremities bilaterally. Tone is normal.  NEUROLOGIC:  Awake, alert, oriented to name, place and time. Cranial nerves II-XII are grossly intact. Motor is 5 out of 5 bilaterally. SKIN:  no bruising or bleeding and normal skin color, texture, turgor      DATA:    EKG 8/30/2018:   Sinus bradycardia with complete heart block and Idioventricular rhythm  Right bundle branch block  Minimal voltage criteria for LVH, may be normal variant  Abnormal ECG  No previous ECGs available    CBC with Differential:    Lab Results   Component Value Date    WBC 8.2 08/30/2018    RBC 4.63 08/30/2018    HGB 14.3 08/30/2018    HCT 43.9 08/30/2018     08/30/2018    MCV 94.8 08/30/2018    MCH 30.9 08/30/2018    MCHC 32.6 08/30/2018    RDW 13.3 08/30/2018    LYMPHOPCT 19 08/30/2018    MONOPCT 10 08/30/2018    BASOPCT 1 08/30/2018    MONOSABS 0.78 08/30/2018    LYMPHSABS 1.59 08/30/2018    EOSABS 0.17 08/30/2018    BASOSABS 0.05 08/30/2018    DIFFTYPE NOT REPORTED 08/30/2018     CMP:    Lab Results   Component Value Date     08/30/2018    K 4.3 08/30/2018     08/30/2018    CO2 24 08/30/2018    BUN 14 08/30/2018    CREATININE 0.73 08/30/2018    GFRAA >60 08/30/2018    LABGLOM >60 08/30/2018    GLUCOSE 93 08/30/2018    CALCIUM 8.6 08/30/2018     Last 3 Troponin:    Lab Results   Component Value Date    TROPONINI 0.05 08/30/2018     Radiology Review:      CXR 8/30/2018    FINDINGS:   Stable moderate cardiomegaly.  Interval placement of dual-chamber pacemaker   via left subclavian approach.  No infiltrates.  No effusions.  No   pneumothorax.  No free air below the diaphragm. Echo 8/30/2018  Summary  LV chamber is normal in size with preserved systolic function. Calculated EF is 50%. Normal right ventricular size and function. Mild mitral regurgitation. No significant pericardial effusion is seen.     IMPRESSION  This is a 82 y.o. male who presented with bradycardia and found to have as above  - Previously had continued lisinopril in-house, now holding for hypotension and CASSI  - Holding PTA amlodipine, hydrochlorothiazide  - Echo, as above with HFrEF, severe valvular disease  - TSH low, fT4 and fT3 wnl; AM cortisol wnl  - Continue to monitor closely    # Seizure disorder  - Continue Keppra     # Elevated TBili  - Mild elevation, stable  - Continue to monitor as acute issues treated    # High risk of delirium  - Apply nonpharmacological measures: reorient patient, communicate clearly, update whiteboard  - Encourage presence of family members  - Ok to use mini team/sitter if needed  - High risk of fall: use bed/chair alarms  - Up in chair with each meal, walk to the toilet    #debility   Order PT/OT        Smoking status: non smoker    Nutrition status: appropriate  Body mass index is 23.4 kg/m². - appropriate weight BMI 18.5-24  DVT Prophylaxis: Heparin drip per protocol  Limited English proficiency with : an  (via two-way interactive audio/visual electronic device), Persian         DISCHARGE PLANNING     The patient's treatment plans were discussed with patient and family.     Recommendations for Discharge   SW     PT Home   OT     SLP        Anticipated discharge destination: Home  Expected Discharge Date: 03/21  Barriers to Discharge: Patient is not medically ready and needs to remain in the hospital today due to diuresis, anticipated cardiac cath and clinical improvement (increasing O2), possible TAVR planning (likely outpatient)        Paola Bose MD  Hospitalist  3/16/2022  2:52 PM   complete heart block. ASSESSMENT/PLAN:    Active Problems:    Complete heart block (HCC)  Resolved Problems:    * No resolved hospital problems. *      1. Complete heart block   - Patient already seen by Cardiology. Received permanent pacemaker today. - On Doxycycline prophylactically. - Troponins negative x 2   - BMP, CBC    2.  HTN, HLD   - On Simvastatin   - Restart home Amlodopine 10, Lisinopril 20      Diet: NPO    DVT Prophylaxis: EPC cuffs    PT/OT evaluation    Discharge planning: Ongoing  Marin Tesfaye,   PGY-1, Internal Medicine Resident  [address] [facility]